# Patient Record
Sex: FEMALE | Race: WHITE | URBAN - NONMETROPOLITAN AREA
[De-identification: names, ages, dates, MRNs, and addresses within clinical notes are randomized per-mention and may not be internally consistent; named-entity substitution may affect disease eponyms.]

---

## 2023-05-05 ENCOUNTER — OFFICE VISIT (OUTPATIENT)
Dept: URBAN - NONMETROPOLITAN AREA CLINIC 2 | Facility: CLINIC | Age: 48
End: 2023-05-05
Payer: COMMERCIAL

## 2023-05-05 ENCOUNTER — LAB OUTSIDE AN ENCOUNTER (OUTPATIENT)
Dept: URBAN - NONMETROPOLITAN AREA CLINIC 2 | Facility: CLINIC | Age: 48
End: 2023-05-05

## 2023-05-05 VITALS
BODY MASS INDEX: 32.21 KG/M2 | HEART RATE: 78 BPM | DIASTOLIC BLOOD PRESSURE: 92 MMHG | HEIGHT: 66 IN | SYSTOLIC BLOOD PRESSURE: 141 MMHG | WEIGHT: 200.4 LBS

## 2023-05-05 DIAGNOSIS — K58.2 MIXED IRRITABLE BOWEL SYNDROME: ICD-10-CM

## 2023-05-05 DIAGNOSIS — Z12.11 COLON CANCER SCREENING: ICD-10-CM

## 2023-05-05 DIAGNOSIS — R19.7 NOCTURNAL DIARRHEA: ICD-10-CM

## 2023-05-05 DIAGNOSIS — K62.89 RECTAL PAIN: ICD-10-CM

## 2023-05-05 PROBLEM — 305058001: Status: ACTIVE | Noted: 2023-05-05

## 2023-05-05 PROBLEM — 440544005: Status: ACTIVE | Noted: 2023-05-05

## 2023-05-05 PROBLEM — 62315008: Status: ACTIVE | Noted: 2023-05-05

## 2023-05-05 PROBLEM — 77880009: Status: ACTIVE | Noted: 2023-05-05

## 2023-05-05 PROCEDURE — 99204 OFFICE O/P NEW MOD 45 MIN: CPT | Performed by: NURSE PRACTITIONER

## 2023-05-05 NOTE — HPI-TODAY'S VISIT:
Ms. Frannie Toledo is a 47 year old M/F who presents today for change in bowel habits over the last 2-3 months, alternating between diarrhea and constipation. She occasionally has bowel movements in the middle of the night. She previously had 1 soft BM/day. Denies any blood in stool. Currently using metamucil every evening.  No history of IBD or CRC in family. Frannie also reports rectal pain that began a few months ago and is described as a stabbing, intermittent, internal pain that is relieved by bearing down. History of hemorrhoids but no relief from typical hemorrhoid therapy such as prep H or lidocaine cream. LG.

## 2023-05-07 LAB
A/G RATIO: 1.8
ALBUMIN: 4.8
ALKALINE PHOSPHATASE: 132
ALT (SGPT): 38
AST (SGOT): 27
BASO (ABSOLUTE): 0
BASOS: 1
BILIRUBIN, TOTAL: <0.2
BUN/CREATININE RATIO: 15
BUN: 12
C-REACTIVE PROTEIN, QUANT: 13
CALCIUM: 10
CARBON DIOXIDE, TOTAL: 24
CHLORIDE: 103
CREATININE: 0.79
DEAMIDATED GLIADIN ABS, IGA: 8
DEAMIDATED GLIADIN ABS, IGG: 2
EGFR: 93
ENDOMYSIAL ANTIBODY IGA: NEGATIVE
EOS (ABSOLUTE): 0.1
EOS: 2
GLOBULIN, TOTAL: 2.7
GLUCOSE: 108
HEMATOCRIT: 38.2
HEMOGLOBIN: 12.8
IMMATURE GRANS (ABS): 0
IMMATURE GRANULOCYTES: 0
IMMUNOGLOBULIN A, QN, SERUM: 287
LYMPHS (ABSOLUTE): 1.2
LYMPHS: 24
MCH: 29.5
MCHC: 33.5
MCV: 88
MONOCYTES(ABSOLUTE): 0.2
MONOCYTES: 5
NEUTROPHILS (ABSOLUTE): 3.4
NEUTROPHILS: 68
PLATELETS: 274
POTASSIUM: 4.5
PROTEIN, TOTAL: 7.5
RBC: 4.34
RDW: 14.9
SEDIMENTATION RATE-WESTERGREN: 14
SODIUM: 142
T-TRANSGLUTAMINASE (TTG) IGA: <2
T-TRANSGLUTAMINASE (TTG) IGG: 3
T4,FREE(DIRECT): 1.14
TSH: 0.56
VITAMIN D, 25-HYDROXY: 25.8
WBC: 4.9

## 2023-06-01 ENCOUNTER — LAB OUTSIDE AN ENCOUNTER (OUTPATIENT)
Dept: URBAN - NONMETROPOLITAN AREA CLINIC 2 | Facility: CLINIC | Age: 48
End: 2023-06-01

## 2023-06-07 LAB
ADENOVIRUS F 40/41: NOT DETECTED
CALPROTECTIN, STOOL - QDX: (no result)
CAMPYLOBACTER: NOT DETECTED
CLOSTRIDIUM DIFFICILE: NOT DETECTED
CRYPTOSPORIDIUM: NOT DETECTED
CYCLOSPORA CAYETANESIS: NOT DETECTED
E. COLI O157: (no result)
ENTAMOEBA HISTOLYTICA: NOT DETECTED
ENTAMOEBA HISTOLYTICA: NOT DETECTED
ENTEROAGGREGATIVE E.COLI: NOT DETECTED
ENTEROTOXIGENIC E.COLI: NOT DETECTED
ESCHERICHIA COLI O157: NOT DETECTED
GIARDIA LAMBLIA: NOT DETECTED
NOROVIRUS GI/GII: NOT DETECTED
NOROVIRUS GI/GII: NOT DETECTED
ROTAVIRUS A: NOT DETECTED
SHIGA-LIKE TOXIN PRODUCING E.COLI: NOT DETECTED
SHIGELLA SPP. / ENTEROINVASIVE E.COLI: NOT DETECTED
VIBRIO CHOLERAE: NOT DETECTED
VIBRIO PARAHAEMOLYTICUS: NOT DETECTED
VIBRIO SPP.: NOT DETECTED
YERSINIA ENTEROCOLITICA: NOT DETECTED
YERSINIA ENTEROCOLITICA: NOT DETECTED

## 2023-06-22 ENCOUNTER — OFFICE VISIT (OUTPATIENT)
Dept: URBAN - NONMETROPOLITAN AREA SURGERY CENTER 1 | Facility: SURGERY CENTER | Age: 48
End: 2023-06-22

## 2023-06-22 ENCOUNTER — CLAIMS CREATED FROM THE CLAIM WINDOW (OUTPATIENT)
Dept: URBAN - NONMETROPOLITAN AREA SURGERY CENTER 1 | Facility: SURGERY CENTER | Age: 48
End: 2023-06-22
Payer: COMMERCIAL

## 2023-06-22 DIAGNOSIS — K62.89 RECTAL PAIN: ICD-10-CM

## 2023-06-22 DIAGNOSIS — Z98.0 INTESTINAL BYPASS AND ANASTOMOSIS STATUS: ICD-10-CM

## 2023-06-22 DIAGNOSIS — R19.4 CHANGE IN BOWEL HABIT: ICD-10-CM

## 2023-06-22 PROCEDURE — 45380 COLONOSCOPY AND BIOPSY: CPT | Performed by: INTERNAL MEDICINE

## 2023-06-22 PROCEDURE — G8907 PT DOC NO EVENTS ON DISCHARG: HCPCS | Performed by: INTERNAL MEDICINE

## 2023-07-25 ENCOUNTER — OFFICE VISIT (OUTPATIENT)
Dept: URBAN - NONMETROPOLITAN AREA CLINIC 2 | Facility: CLINIC | Age: 48
End: 2023-07-25
Payer: COMMERCIAL

## 2023-07-25 ENCOUNTER — WEB ENCOUNTER (OUTPATIENT)
Dept: URBAN - NONMETROPOLITAN AREA CLINIC 2 | Facility: CLINIC | Age: 48
End: 2023-07-25

## 2023-07-25 ENCOUNTER — DASHBOARD ENCOUNTERS (OUTPATIENT)
Age: 48
End: 2023-07-25

## 2023-07-25 VITALS
BODY MASS INDEX: 32.54 KG/M2 | WEIGHT: 202.5 LBS | HEART RATE: 77 BPM | SYSTOLIC BLOOD PRESSURE: 123 MMHG | DIASTOLIC BLOOD PRESSURE: 85 MMHG | HEIGHT: 66 IN

## 2023-07-25 DIAGNOSIS — Z12.11 COLON CANCER SCREENING: ICD-10-CM

## 2023-07-25 DIAGNOSIS — R19.7 NOCTURNAL DIARRHEA: ICD-10-CM

## 2023-07-25 DIAGNOSIS — K58.2 MIXED IRRITABLE BOWEL SYNDROME: ICD-10-CM

## 2023-07-25 DIAGNOSIS — K62.89 RECTAL PAIN: ICD-10-CM

## 2023-07-25 PROCEDURE — 99213 OFFICE O/P EST LOW 20 MIN: CPT

## 2023-07-25 RX ORDER — HYOSCYAMINE SULFATE 0.12 MG/1
1 TABLET UNDER THE TONGUE AND ALLOW TO DISSOLVE AS NEEDED TABLET, ORALLY DISINTEGRATING ORAL AS NEEDED
Qty: 90 | Refills: 1 | OUTPATIENT
Start: 2023-07-25 | End: 2023-09-23

## 2023-07-25 NOTE — HPI-TODAY'S VISIT:
Ms. Frannie Toledo is a 47 year old M/F who presents today for change in bowel habits over the last 2-3 months, alternating between diarrhea and constipation. She occasionally has bowel movements in the middle of the night. She previously had 1 soft BM/day. Denies any blood in stool. Currently using metamucil every evening.  No history of IBD or CRC in family. Frannie also reports rectal pain that began a few months ago and is described as a stabbing, intermittent, internal pain that is relieved by bearing down. History of hemorrhoids but no relief from typical hemorrhoid therapy such as prep H or lidocaine cream. LG. 7/25/2023 Frannie returns after her colonoscopy with Dr. Lawton which revealed normal colon, random biopsies resulted negative for inflammation or microscopic colitis.  She continues with occasional rectal pain, Dr. Lawton suggested nitroglycerin ointment as needed suspecting proctalgia fugax.  She also continues with some nocturnal bowel movements that are urgent.  Her stool studies completed with Teodora were negative. Today we discussed trying Levsin as needed for nighttime bowel movements and she will work on food diary utilizing the low FODMAP guidelines possibly identifying common denominator.  She will return in 6 months to discuss with Evelyn. She is a  and would like to work on her school schedule. GENEVA

## 2023-07-31 ENCOUNTER — OFFICE VISIT (OUTPATIENT)
Dept: URBAN - NONMETROPOLITAN AREA CLINIC 2 | Facility: CLINIC | Age: 48
End: 2023-07-31